# Patient Record
Sex: FEMALE | Race: WHITE | NOT HISPANIC OR LATINO | Employment: FULL TIME | URBAN - METROPOLITAN AREA
[De-identification: names, ages, dates, MRNs, and addresses within clinical notes are randomized per-mention and may not be internally consistent; named-entity substitution may affect disease eponyms.]

---

## 2017-09-09 ENCOUNTER — OFFICE VISIT (OUTPATIENT)
Dept: URGENT CARE | Facility: CLINIC | Age: 47
End: 2017-09-09
Payer: COMMERCIAL

## 2017-09-09 VITALS
RESPIRATION RATE: 20 BRPM | HEART RATE: 84 BPM | OXYGEN SATURATION: 96 % | WEIGHT: 172 LBS | HEIGHT: 65 IN | DIASTOLIC BLOOD PRESSURE: 80 MMHG | BODY MASS INDEX: 28.66 KG/M2 | SYSTOLIC BLOOD PRESSURE: 128 MMHG | TEMPERATURE: 98.2 F

## 2017-09-09 DIAGNOSIS — J06.9 VIRAL URI WITH COUGH: ICD-10-CM

## 2017-09-09 LAB
INT CON NEG: NORMAL
INT CON POS: NORMAL
S PYO AG THROAT QL: NEGATIVE

## 2017-09-09 PROCEDURE — 99203 OFFICE O/P NEW LOW 30 MIN: CPT | Performed by: PHYSICIAN ASSISTANT

## 2017-09-09 PROCEDURE — 87880 STREP A ASSAY W/OPTIC: CPT | Performed by: PHYSICIAN ASSISTANT

## 2017-09-09 RX ORDER — ESTRADIOL 10 UG/1
10 INSERT VAGINAL EVERY EVENING
COMMUNITY

## 2017-09-09 RX ORDER — LEVOTHYROXINE SODIUM 0.12 MG/1
125 TABLET ORAL
COMMUNITY

## 2017-09-09 RX ORDER — LEUCOVORIN CALCIUM 5 MG/1
5 TABLET ORAL
COMMUNITY

## 2017-09-09 ASSESSMENT — ENCOUNTER SYMPTOMS
SPUTUM PRODUCTION: 0
SHORTNESS OF BREATH: 0
SORE THROAT: 1
COUGH: 1
PALPITATIONS: 0
CHILLS: 1
HEMOPTYSIS: 0
EYE REDNESS: 0
HEADACHES: 0
EYE DISCHARGE: 0
EYE PAIN: 0
FEVER: 0
WHEEZING: 0
STRIDOR: 0

## 2017-09-09 NOTE — PATIENT INSTRUCTIONS
Serous Otitis Media  Serous otitis media is fluid in the middle ear space. This space contains the bones for hearing and air. Air in the middle ear space helps to transmit sound.   The air gets there through the eustachian tube. This tube goes from the back of the nose (nasopharynx) to the middle ear space. It keeps the pressure in the middle ear the same as the outside world. It also helps to drain fluid from the middle ear space.  CAUSES   Serous otitis media occurs when the eustachian tube gets blocked. Blockage can come from:  · Ear infections.  · Colds and other upper respiratory infections.  · Allergies.  · Irritants such as cigarette smoke.  · Sudden changes in air pressure (such as descending in an airplane).  · Enlarged adenoids.  · A mass in the nasopharynx.  During colds and upper respiratory infections, the middle ear space can become temporarily filled with fluid. This can happen after an ear infection also. Once the infection clears, the fluid will generally drain out of the ear through the eustachian tube. If it does not, then serous otitis media occurs.  SIGNS AND SYMPTOMS   · Hearing loss.  · A feeling of fullness in the ear, without pain.  · Young children may not show any symptoms but may show slight behavioral changes, such as agitation, ear pulling, or crying.  DIAGNOSIS   Serous otitis media is diagnosed by an ear exam. Tests may be done to check on the movement of the eardrum. Hearing exams may also be done.  TREATMENT   The fluid most often goes away without treatment. If allergy is the cause, allergy treatment may be helpful. Fluid that persists for several months may require minor surgery. A small tube is placed in the eardrum to:  · Drain the fluid.  · Restore the air in the middle ear space.  In certain situations, antibiotic medicines are used to avoid surgery. Surgery may be done to remove enlarged adenoids (if this is the cause).  HOME CARE INSTRUCTIONS   · Keep children away from  tobacco smoke.  · Keep all follow-up visits as directed by your health care provider.  SEEK MEDICAL CARE IF:   · Your hearing is not better in 3 months.  · Your hearing is worse.  · You have ear pain.  · You have drainage from the ear.  · You have dizziness.  · You have serous otitis media only in one ear or have any bleeding from your nose (epistaxis).  · You notice a lump on your neck.  MAKE SURE YOU:  · Understand these instructions.    · Will watch your condition.    · Will get help right away if you are not doing well or get worse.       This information is not intended to replace advice given to you by your health care provider. Make sure you discuss any questions you have with your health care provider.     Document Released: 03/09/2005 Document Revised: 01/08/2016 Document Reviewed: 07/15/2014  [employer] Interactive Patient Education ©2016 [employer]

## 2017-09-10 NOTE — PROGRESS NOTES
"Subjective:      Greer Vargas is a 47 y.o. female who presents with Pharyngitis (Few days stuffy nose , sorethroat , dry cough )            Pharyngitis    This is a new problem. The current episode started yesterday. The problem has been unchanged. There has been no fever. The pain is mild. Associated symptoms include congestion, coughing and ear pain. Pertinent negatives include no ear discharge, headaches, shortness of breath or stridor. She has tried NSAIDs and gargles for the symptoms. The treatment provided moderate relief.       Review of Systems   Constitutional: Positive for chills and malaise/fatigue. Negative for fever.   HENT: Positive for congestion, ear pain and sore throat. Negative for ear discharge.    Eyes: Negative for pain, discharge and redness.   Respiratory: Positive for cough. Negative for hemoptysis, sputum production, shortness of breath, wheezing and stridor.    Cardiovascular: Negative for chest pain and palpitations.   Skin: Negative for itching and rash.   Neurological: Negative for headaches.   All other systems reviewed and are negative.         Objective:     /80   Pulse 84   Temp 36.8 °C (98.2 °F)   Resp 20   Ht 1.638 m (5' 4.5\")   Wt 78 kg (172 lb)   SpO2 96%   BMI 29.07 kg/m²      Physical Exam   Constitutional: She is oriented to person, place, and time. Vital signs are normal. She appears well-developed and well-nourished.   HENT:   Head: Normocephalic and atraumatic.   Right Ear: Hearing, tympanic membrane, external ear and ear canal normal.   Left Ear: Hearing, tympanic membrane, external ear and ear canal normal.   Nose: Nose normal.   Mouth/Throat: Uvula is midline and mucous membranes are normal. Posterior oropharyngeal erythema present. No oropharyngeal exudate, posterior oropharyngeal edema or tonsillar abscesses.   Neck: Normal range of motion. Neck supple.   Cardiovascular: Normal rate and regular rhythm.  Exam reveals no gallop and no friction rub.    No " murmur heard.  Pulmonary/Chest: Effort normal and breath sounds normal. She has no wheezes. She has no rales.   Lymphadenopathy:     She has no cervical adenopathy.   Neurological: She is alert and oriented to person, place, and time.   Skin: Skin is warm and dry.   Psychiatric: She has a normal mood and affect. Her behavior is normal. Judgment and thought content normal.   Vitals reviewed.            Rapid Strep: NEG  Assessment/Plan:     1. Viral URI with cough  - OTC decongestant/cough medicine  - POCT Rapid Strep A    Differential diagnosis, natural history, supportive care, and indications for immediate follow-up discussed at length.   Follow-up with primary care provider within 4-5 days, emergency room precautions discussed.  Patient and/or family appears understanding of information.

## 2017-09-27 ENCOUNTER — HOSPITAL ENCOUNTER (OUTPATIENT)
Facility: MEDICAL CENTER | Age: 47
End: 2017-09-27
Attending: PHYSICIAN ASSISTANT
Payer: COMMERCIAL

## 2017-09-27 ENCOUNTER — OFFICE VISIT (OUTPATIENT)
Dept: URGENT CARE | Facility: CLINIC | Age: 47
End: 2017-09-27
Payer: COMMERCIAL

## 2017-09-27 VITALS
RESPIRATION RATE: 14 BRPM | SYSTOLIC BLOOD PRESSURE: 104 MMHG | HEART RATE: 101 BPM | BODY MASS INDEX: 27.99 KG/M2 | TEMPERATURE: 98.1 F | WEIGHT: 168 LBS | HEIGHT: 65 IN | DIASTOLIC BLOOD PRESSURE: 86 MMHG | OXYGEN SATURATION: 100 %

## 2017-09-27 DIAGNOSIS — N76.0 ACUTE VAGINITIS: ICD-10-CM

## 2017-09-27 DIAGNOSIS — J02.9 EXUDATIVE PHARYNGITIS: ICD-10-CM

## 2017-09-27 DIAGNOSIS — K13.21 LEUKOPLAKIA OF ORAL CAVITY: ICD-10-CM

## 2017-09-27 DIAGNOSIS — K13.29 TONGUE PLAQUE: Primary | ICD-10-CM

## 2017-09-27 PROCEDURE — 87591 N.GONORRHOEAE DNA AMP PROB: CPT

## 2017-09-27 PROCEDURE — 87660 TRICHOMONAS VAGIN DIR PROBE: CPT

## 2017-09-27 PROCEDURE — 87480 CANDIDA DNA DIR PROBE: CPT

## 2017-09-27 PROCEDURE — 99214 OFFICE O/P EST MOD 30 MIN: CPT | Performed by: PHYSICIAN ASSISTANT

## 2017-09-27 PROCEDURE — 87491 CHLMYD TRACH DNA AMP PROBE: CPT

## 2017-09-27 PROCEDURE — 87070 CULTURE OTHR SPECIMN AEROBIC: CPT

## 2017-09-27 PROCEDURE — 99000 SPECIMEN HANDLING OFFICE-LAB: CPT | Performed by: PHYSICIAN ASSISTANT

## 2017-09-27 PROCEDURE — 87510 GARDNER VAG DNA DIR PROBE: CPT

## 2017-09-27 RX ORDER — CEFDINIR 300 MG/1
300 CAPSULE ORAL 2 TIMES DAILY
Qty: 20 CAP | Refills: 0 | Status: SHIPPED | OUTPATIENT
Start: 2017-09-27 | End: 2017-10-07

## 2017-09-27 RX ORDER — CEFDINIR 300 MG/1
300 CAPSULE ORAL 2 TIMES DAILY
Qty: 20 CAP | Refills: 0 | Status: SHIPPED | OUTPATIENT
Start: 2017-09-27 | End: 2017-09-27 | Stop reason: SDUPTHER

## 2017-09-27 NOTE — PATIENT INSTRUCTIONS
Leukoplakia  Leukoplakia refers to white patches that develop in your mouth. These patches may show up on the insides of your cheeks, on or under your tongue, or on your gums. Leukoplakia can also develop on the outer area of the female genitalia (vulva ). In rare cases, it can occur in the area around the anus.  Leukoplakia usually goes away with treatment. In some cases, leukoplakia can indicate an increased risk of cancer.  CAUSES  Many conditions can cause or increase the risk of leukoplakia in the mouth. These may include:  · Any type of tobacco use, especially when combined with the use of alcohol.  · Irritation of the mouth from rough teeth or dentures.  · Having a weakened defense system (immune system), such as occurs with HIV or AIDS or after a bone marrow transplant.  Many conditions can also cause or increase the risk of leukoplakia of the vulva, including:  · Long-lasting infections of the vulva.  · Some skin diseases.  · Long-lasting irritation of the vulva.  · Long-term use of steroid creams or ointments.  · Having a weakened immune system, such as occurs with HIV or AIDS or after a bone marrow transplant.  SIGNS AND SYMPTOMS  The main symptom is the development of patches or flat areas in the mouth or on the vulva. These patches may be:  · Odd shaped.  · Hard.  · Raised.  · White or gray in color. Some areas may be reddened.  · Unable to be wiped away or scraped away.  · Fuzzy.  · Sensitive to touch, heat, or foods that are spicy or acidic.  DIAGNOSIS  Leukoplakia has a distinct appearance, so your health care provider can usually make a diagnosis by closely examining the affected area. A sample of the white patch may be removed (biopsy) and examined under a microscope. This helps to confirm the diagnosis and make sure that it is not a form of cancer.  TREATMENT  Treatment for leukoplakia may include:  · Stopping tobacco use.  · Repairing any rough teeth or dentures.  · Surgical removal of the patch.  This may be done using a surgical knife (scalpel), laser, heat, or cold.  · Medicines that can be taken by mouth.  · Medicines that can be applied to the patches.  HOME CARE INSTRUCTIONS  · Do not use any tobacco products, including cigarettes, chewing tobacco, or electronic cigarettes. If you need help quitting, ask your health care provider.  · Check with your dentist to see if you need any repairs to teeth or dentures.  · Take medicines only as directed by your health care provider.  · Avoid foods or beverages that seem to irritate the leukoplakia.  SEEK MEDICAL CARE IF:  · You develop new patches of leukoplakia.  · You notice changes in the size, shape, or feeling of existing patches.  · You have a fever.  SEEK IMMEDIATE MEDICAL CARE IF:  · You have severe pain in the area of a patch, and the pain is not helped by prescribed medicine.  · You have bleeding in the area of a patch, and you cannot stop the bleeding.  · You have a patch in your mouth that becomes so swollen that you have trouble eating or breathing.  · You have a patch at the vulva that becomes so swollen that you have trouble passing urine.     This information is not intended to replace advice given to you by your health care provider. Make sure you discuss any questions you have with your health care provider.     Document Released: 11/30/2009 Document Revised: 01/08/2016 Document Reviewed: 07/28/2015  [employer] Interactive Patient Education ©2016 [employer]

## 2017-09-27 NOTE — PROGRESS NOTES
"Subjective:      PT is a 47 y.o. female who presents with Other (Sore Throat)            HPI  Pt notes 2 weeks for sore throat with redness and white spots on the back of the throat along with white \"plaques\" on the right side of her tongue and right gums. Pt also notes midl vaginitis and wishes to have swabs of all regions she is concerned with. Pt has not taken any Rx medications for this condition. Pt states the pain is a 7/10, aching in nature and worse at night. Pt denies CP, SOB, NVD, paresthesias, headaches, dizziness, change in vision, hives, or other joint pain. The pt's medication list, problem list, and allergies have been evaluated and reviewed during today's visit.    PMH:  Negative per pt.      PSH:  Negative per pt.      Fam Hx:  the patient's family history is not pertinent to their current complaint      Soc HX:  Social History     Social History   • Marital status:      Spouse name: N/A   • Number of children: N/A   • Years of education: N/A     Occupational History   • Not on file.     Social History Main Topics   • Smoking status: Former Smoker   • Smokeless tobacco: Never Used      Comment: 4 years ago .   • Alcohol use Not on file   • Drug use: Unknown   • Sexual activity: Not on file     Other Topics Concern   • Not on file     Social History Narrative   • No narrative on file         Medications:    Current Outpatient Prescriptions:   •  cefdinir (OMNICEF) 300 MG Cap, Take 1 Cap by mouth 2 times a day for 10 days., Disp: 20 Cap, Rfl: 0  •  nystatin (MYCOSTATIN) 974674 UNIT/ML Suspension, Take 5 mL by mouth 4 times a day for 10 days., Disp: 200 mL, Rfl: 0  •  Abatacept (ORENCIA CLICKJECT) 125 MG/ML Solution Auto-injector, Inject  as instructed., Disp: , Rfl:   •  Methotrexate, PF, 15 MG/0.4ML Solution Auto-injector, Inject  as instructed., Disp: , Rfl:   •  leucovorin 5 MG Tab, Take 5 mg by mouth every day., Disp: , Rfl:   •  estradiol (VAGIFEM) 10 MCG Tab, Insert 10 mcg in vagina every " "day., Disp: , Rfl:   •  levothyroxine (SYNTHROID) 125 MCG Tab, Take 125 mcg by mouth Every morning on an empty stomach., Disp: , Rfl:       Allergies:  Review of patient's allergies indicates no known allergies.    ROS  Constitutional: Negative for fever, chills and malaise/fatigue.   HENT: Negative for congestion and +sore throat.    Eyes: Negative for blurred vision, double vision and photophobia.   Respiratory: Negative for cough and shortness of breath.    Cardiovascular: Negative for chest pain and palpitations.   Gastrointestinal: Negative for heartburn, nausea, vomiting, abdominal pain, diarrhea and constipation.   Genitourinary: Negative for dysuria and flank pain. +vaginitis  Musculoskeletal: Negative for joint pain and myalgias.   Skin: Negative for itching and rash.   Neurological: Negative for dizziness, tingling and headaches.   Endo/Heme/Allergies: Does not bruise/bleed easily.   Psychiatric/Behavioral: Negative for depression. The patient is not nervous/anxious.           Objective:     /86   Pulse (!) 101   Temp 36.7 °C (98.1 °F)   Resp 14   Ht 1.638 m (5' 4.5\")   Wt 76.2 kg (168 lb)   SpO2 100%   BMI 28.39 kg/m²      Physical Exam   HENT:   Mouth/Throat: Uvula is midline and mucous membranes are normal. Oral lesions present. No trismus in the jaw. Abnormal dentition. Dental caries present. No dental abscesses, uvula swelling or lacerations. Oropharyngeal exudate, posterior oropharyngeal edema and posterior oropharyngeal erythema present. No tonsillar abscesses.             Physical Exam   Constitutional: She is oriented to person, place, and time. She appears well-developed and well-nourished. No distress.   HENT:   Head: Normocephalic and atraumatic.   Right Ear: External ear normal.   Left Ear: External ear normal.   Nose: Nose normal.   Eyes: Conjunctivae normal and EOM are normal. Pupils are equal, round, and reactive to light.   Neck: Normal range of motion. Neck supple. No " thyromegaly present.   Cardiovascular: Normal rate, regular rhythm, normal heart sounds and intact distal pulses.  Exam reveals no gallop and no friction rub.    No murmur heard.  Pulmonary/Chest: Effort normal and breath sounds normal. No respiratory distress. She has no wheezes. She has no rales. She exhibits no tenderness.   Abdominal: Soft. Bowel sounds are normal. She exhibits no distension and no mass. There is no tenderness. There is no rebound and no guarding.   Genitourinary:        Pt deferred and wishes to collect own vaginal swabs  Musculoskeletal: Normal range of motion. She exhibits no edema and no tenderness.   Lymphadenopathy:     She has no cervical adenopathy.   Neurological: She is alert and oriented to person, place, and time. She has normal reflexes. No cranial nerve deficit.   Skin: Skin is warm and dry. No rash noted. No erythema.   Psychiatric: She has a normal mood and affect. Her behavior is normal. Judgment and thought content normal.          Assessment/Plan:     1. Tongue plaque    - REFERRAL TO ENT  - nystatin (MYCOSTATIN) [id number] UNIT/ML Suspension; Take 5 mL by mouth 4 times a day for 10 days.  Dispense: 200 mL; Refill: 0    2. Exudative pharyngitis    - CULTURE THROAT; Future  - REFERRAL TO ENT  - cefdinir (OMNICEF) 300 MG Cap; Take 1 Cap by mouth 2 times a day for 10 days.  Dispense: 20 Cap; Refill: 0    3. Leukoplakia of oral cavity      4. Acute vaginitis    - VAGINAL PATHOGENS DNA PANEL; Future  - CHLAMYDIA/GC PCR URINE OR SWAB; Future    Rest, fluids encouraged.  AVS with medical info given.  Pt was in full understanding and agreement with the plan.  Follow-up as needed if symptoms worsen or fail to improve.

## 2017-09-28 ENCOUNTER — TELEPHONE (OUTPATIENT)
Dept: URGENT CARE | Facility: CLINIC | Age: 47
End: 2017-09-28

## 2017-09-28 DIAGNOSIS — B37.31 VAGINA, CANDIDIASIS: ICD-10-CM

## 2017-09-28 LAB
C TRACH DNA SPEC QL NAA+PROBE: NEGATIVE
CANDIDA DNA VAG QL PROBE+SIG AMP: POSITIVE
G VAGINALIS DNA VAG QL PROBE+SIG AMP: NEGATIVE
N GONORRHOEA DNA SPEC QL NAA+PROBE: NEGATIVE
SPECIMEN SOURCE: NORMAL
T VAGINALIS DNA VAG QL PROBE+SIG AMP: NEGATIVE

## 2017-09-28 RX ORDER — FLUCONAZOLE 150 MG/1
TABLET ORAL
Qty: 3 TAB | Refills: 0 | Status: SHIPPED | OUTPATIENT
Start: 2017-09-28 | End: 2017-09-29

## 2017-09-29 ENCOUNTER — HOSPITAL ENCOUNTER (OUTPATIENT)
Facility: MEDICAL CENTER | Age: 47
End: 2017-09-30
Attending: EMERGENCY MEDICINE | Admitting: INTERNAL MEDICINE
Payer: COMMERCIAL

## 2017-09-29 ENCOUNTER — APPOINTMENT (OUTPATIENT)
Dept: RADIOLOGY | Facility: MEDICAL CENTER | Age: 47
End: 2017-09-29
Attending: EMERGENCY MEDICINE
Payer: COMMERCIAL

## 2017-09-29 DIAGNOSIS — F15.10 EXCESSIVE CAFFEINE ABUSE, CONTINUOUS (HCC): ICD-10-CM

## 2017-09-29 DIAGNOSIS — R06.02 SHORTNESS OF BREATH: ICD-10-CM

## 2017-09-29 DIAGNOSIS — F15.10 METHAMPHETAMINE ABUSE, EPISODIC (HCC): ICD-10-CM

## 2017-09-29 DIAGNOSIS — R07.9 CHEST PAIN, UNSPECIFIED TYPE: ICD-10-CM

## 2017-09-29 DIAGNOSIS — F43.23 SITUATIONAL MIXED ANXIETY AND DEPRESSIVE DISORDER: ICD-10-CM

## 2017-09-29 DIAGNOSIS — R07.89 INTERMITTENT LEFT-SIDED CHEST PAIN: ICD-10-CM

## 2017-09-29 LAB
ALBUMIN SERPL BCP-MCNC: 4.4 G/DL (ref 3.2–4.9)
ALBUMIN/GLOB SERPL: 1.5 G/DL
ALP SERPL-CCNC: 70 U/L (ref 30–99)
ALT SERPL-CCNC: 30 U/L (ref 2–50)
ANION GAP SERPL CALC-SCNC: 9 MMOL/L (ref 0–11.9)
APTT PPP: 32.1 SEC (ref 24.7–36)
AST SERPL-CCNC: 27 U/L (ref 12–45)
BASOPHILS # BLD AUTO: 0.7 % (ref 0–1.8)
BASOPHILS # BLD: 0.05 K/UL (ref 0–0.12)
BILIRUB SERPL-MCNC: 0.4 MG/DL (ref 0.1–1.5)
BUN SERPL-MCNC: 9 MG/DL (ref 8–22)
CALCIUM SERPL-MCNC: 9.5 MG/DL (ref 8.5–10.5)
CHLORIDE SERPL-SCNC: 102 MMOL/L (ref 96–112)
CO2 SERPL-SCNC: 25 MMOL/L (ref 20–33)
CREAT SERPL-MCNC: 0.79 MG/DL (ref 0.5–1.4)
DEPRECATED D DIMER PPP IA-ACNC: <200 NG/ML(D-DU)
EKG IMPRESSION: NORMAL
EOSINOPHIL # BLD AUTO: 0.1 K/UL (ref 0–0.51)
EOSINOPHIL NFR BLD: 1.4 % (ref 0–6.9)
ERYTHROCYTE [DISTWIDTH] IN BLOOD BY AUTOMATED COUNT: 49.6 FL (ref 35.9–50)
GFR SERPL CREATININE-BSD FRML MDRD: >60 ML/MIN/1.73 M 2
GLOBULIN SER CALC-MCNC: 2.9 G/DL (ref 1.9–3.5)
GLUCOSE SERPL-MCNC: 100 MG/DL (ref 65–99)
HCT VFR BLD AUTO: 42.2 % (ref 37–47)
HGB BLD-MCNC: 14.8 G/DL (ref 12–16)
IMM GRANULOCYTES # BLD AUTO: 0.02 K/UL (ref 0–0.11)
IMM GRANULOCYTES NFR BLD AUTO: 0.3 % (ref 0–0.9)
INR PPP: 0.97 (ref 0.87–1.13)
LIPASE SERPL-CCNC: 34 U/L (ref 11–82)
LYMPHOCYTES # BLD AUTO: 1.06 K/UL (ref 1–4.8)
LYMPHOCYTES NFR BLD: 15.1 % (ref 22–41)
MCH RBC QN AUTO: 34.6 PG (ref 27–33)
MCHC RBC AUTO-ENTMCNC: 35.1 G/DL (ref 33.6–35)
MCV RBC AUTO: 98.6 FL (ref 81.4–97.8)
MONOCYTES # BLD AUTO: 0.41 K/UL (ref 0–0.85)
MONOCYTES NFR BLD AUTO: 5.8 % (ref 0–13.4)
NEUTROPHILS # BLD AUTO: 5.37 K/UL (ref 2–7.15)
NEUTROPHILS NFR BLD: 76.7 % (ref 44–72)
NRBC # BLD AUTO: 0 K/UL
NRBC BLD AUTO-RTO: 0 /100 WBC
PLATELET # BLD AUTO: 360 K/UL (ref 164–446)
PMV BLD AUTO: 8.5 FL (ref 9–12.9)
POTASSIUM SERPL-SCNC: 3.7 MMOL/L (ref 3.6–5.5)
PROT SERPL-MCNC: 7.3 G/DL (ref 6–8.2)
PROTHROMBIN TIME: 13.2 SEC (ref 12–14.6)
RBC # BLD AUTO: 4.28 M/UL (ref 4.2–5.4)
SODIUM SERPL-SCNC: 136 MMOL/L (ref 135–145)
TROPONIN I SERPL-MCNC: <0.01 NG/ML (ref 0–0.04)
WBC # BLD AUTO: 7 K/UL (ref 4.8–10.8)

## 2017-09-29 PROCEDURE — 93005 ELECTROCARDIOGRAM TRACING: CPT | Performed by: EMERGENCY MEDICINE

## 2017-09-29 PROCEDURE — 84484 ASSAY OF TROPONIN QUANT: CPT

## 2017-09-29 PROCEDURE — 80053 COMPREHEN METABOLIC PANEL: CPT

## 2017-09-29 PROCEDURE — 85730 THROMBOPLASTIN TIME PARTIAL: CPT

## 2017-09-29 PROCEDURE — 84443 ASSAY THYROID STIM HORMONE: CPT

## 2017-09-29 PROCEDURE — 85025 COMPLETE CBC W/AUTO DIFF WBC: CPT

## 2017-09-29 PROCEDURE — 80307 DRUG TEST PRSMV CHEM ANLYZR: CPT

## 2017-09-29 PROCEDURE — 85379 FIBRIN DEGRADATION QUANT: CPT

## 2017-09-29 PROCEDURE — 85610 PROTHROMBIN TIME: CPT

## 2017-09-29 PROCEDURE — 99285 EMERGENCY DEPT VISIT HI MDM: CPT

## 2017-09-29 PROCEDURE — 83690 ASSAY OF LIPASE: CPT

## 2017-09-29 PROCEDURE — 93005 ELECTROCARDIOGRAM TRACING: CPT

## 2017-09-29 PROCEDURE — 71010 DX-CHEST-PORTABLE (1 VIEW): CPT

## 2017-09-29 PROCEDURE — 36415 COLL VENOUS BLD VENIPUNCTURE: CPT

## 2017-09-29 RX ORDER — METHOTREXATE 25 MG/ML
25 INJECTION, SOLUTION INTRA-ARTERIAL; INTRAMUSCULAR; INTRAVENOUS
COMMUNITY

## 2017-09-29 RX ORDER — NAPROXEN SODIUM 220 MG
220-440 TABLET ORAL 2 TIMES DAILY PRN
COMMUNITY

## 2017-09-29 RX ORDER — OXYCODONE AND ACETAMINOPHEN 7.5; 325 MG/1; MG/1
1 TABLET ORAL EVERY 6 HOURS PRN
COMMUNITY

## 2017-09-29 RX ORDER — CLOMIPRAMINE HYDROCHLORIDE 50 MG/1
1000 CAPSULE ORAL NIGHTLY
COMMUNITY

## 2017-09-29 RX ORDER — ASPIRIN 81 MG/1
81 TABLET, CHEWABLE ORAL DAILY
COMMUNITY

## 2017-09-29 RX ORDER — ALPHA LIPOIC ACID 200 MG
1 CAPSULE ORAL DAILY
COMMUNITY

## 2017-09-29 RX ORDER — TEMAZEPAM 30 MG/1
30 CAPSULE ORAL
COMMUNITY

## 2017-09-30 ENCOUNTER — RESOLUTE PROFESSIONAL BILLING HOSPITAL PROF FEE (OUTPATIENT)
Dept: HOSPITALIST | Facility: MEDICAL CENTER | Age: 47
End: 2017-09-30
Payer: COMMERCIAL

## 2017-09-30 VITALS
DIASTOLIC BLOOD PRESSURE: 67 MMHG | RESPIRATION RATE: 16 BRPM | TEMPERATURE: 98.1 F | OXYGEN SATURATION: 97 % | HEART RATE: 73 BPM | WEIGHT: 171.08 LBS | BODY MASS INDEX: 29.21 KG/M2 | SYSTOLIC BLOOD PRESSURE: 122 MMHG | HEIGHT: 64 IN

## 2017-09-30 DIAGNOSIS — R07.9 CHEST PAIN, UNSPECIFIED TYPE: ICD-10-CM

## 2017-09-30 PROBLEM — M06.9 RHEUMATOID ARTHRITIS (HCC): Status: ACTIVE | Noted: 2017-09-30

## 2017-09-30 LAB
AMPHET UR QL SCN: POSITIVE
BACTERIA SPEC RESP CULT: NORMAL
BARBITURATES UR QL SCN: NEGATIVE
BENZODIAZ UR QL SCN: NEGATIVE
BZE UR QL SCN: NEGATIVE
CANNABINOIDS UR QL SCN: NEGATIVE
ETHANOL BLD-MCNC: 0 G/DL
METHADONE UR QL SCN: NEGATIVE
OPIATES UR QL SCN: NEGATIVE
OXYCODONE UR QL SCN: NEGATIVE
PCP UR QL SCN: NEGATIVE
PROPOXYPH UR QL SCN: NEGATIVE
SIGNIFICANT IND 70042: NORMAL
SOURCE SOURCE: NORMAL
TROPONIN I SERPL-MCNC: <0.01 NG/ML (ref 0–0.04)
TROPONIN I SERPL-MCNC: <0.01 NG/ML (ref 0–0.04)
TSH SERPL DL<=0.005 MIU/L-ACNC: 0.57 UIU/ML (ref 0.3–3.7)

## 2017-09-30 PROCEDURE — A9270 NON-COVERED ITEM OR SERVICE: HCPCS | Performed by: INTERNAL MEDICINE

## 2017-09-30 PROCEDURE — 700102 HCHG RX REV CODE 250 W/ 637 OVERRIDE(OP): Performed by: INTERNAL MEDICINE

## 2017-09-30 PROCEDURE — 93010 ELECTROCARDIOGRAM REPORT: CPT | Performed by: INTERNAL MEDICINE

## 2017-09-30 PROCEDURE — G0378 HOSPITAL OBSERVATION PER HR: HCPCS

## 2017-09-30 PROCEDURE — 700105 HCHG RX REV CODE 258: Performed by: STUDENT IN AN ORGANIZED HEALTH CARE EDUCATION/TRAINING PROGRAM

## 2017-09-30 PROCEDURE — 99219 PR INITIAL OBSERVATION CARE,LEVL II: CPT | Mod: GC | Performed by: INTERNAL MEDICINE

## 2017-09-30 PROCEDURE — A9270 NON-COVERED ITEM OR SERVICE: HCPCS | Performed by: STUDENT IN AN ORGANIZED HEALTH CARE EDUCATION/TRAINING PROGRAM

## 2017-09-30 PROCEDURE — 36415 COLL VENOUS BLD VENIPUNCTURE: CPT

## 2017-09-30 PROCEDURE — 94760 N-INVAS EAR/PLS OXIMETRY 1: CPT

## 2017-09-30 PROCEDURE — 700102 HCHG RX REV CODE 250 W/ 637 OVERRIDE(OP): Performed by: STUDENT IN AN ORGANIZED HEALTH CARE EDUCATION/TRAINING PROGRAM

## 2017-09-30 PROCEDURE — 84484 ASSAY OF TROPONIN QUANT: CPT

## 2017-09-30 PROCEDURE — 93005 ELECTROCARDIOGRAM TRACING: CPT | Performed by: STUDENT IN AN ORGANIZED HEALTH CARE EDUCATION/TRAINING PROGRAM

## 2017-09-30 RX ORDER — POLYETHYLENE GLYCOL 3350 17 G/17G
1 POWDER, FOR SOLUTION ORAL
Status: DISCONTINUED | OUTPATIENT
Start: 2017-09-30 | End: 2017-09-30 | Stop reason: HOSPADM

## 2017-09-30 RX ORDER — AMOXICILLIN 250 MG
2 CAPSULE ORAL 2 TIMES DAILY
Status: DISCONTINUED | OUTPATIENT
Start: 2017-09-30 | End: 2017-09-30 | Stop reason: HOSPADM

## 2017-09-30 RX ORDER — ASPIRIN 325 MG
325 TABLET ORAL ONCE
Status: COMPLETED | OUTPATIENT
Start: 2017-09-30 | End: 2017-09-30

## 2017-09-30 RX ORDER — OXYCODONE AND ACETAMINOPHEN 7.5; 325 MG/1; MG/1
1 TABLET ORAL EVERY 6 HOURS PRN
Status: DISCONTINUED | OUTPATIENT
Start: 2017-09-30 | End: 2017-09-30 | Stop reason: HOSPADM

## 2017-09-30 RX ORDER — SODIUM CHLORIDE 9 MG/ML
INJECTION, SOLUTION INTRAVENOUS CONTINUOUS
Status: DISCONTINUED | OUTPATIENT
Start: 2017-09-30 | End: 2017-09-30 | Stop reason: HOSPADM

## 2017-09-30 RX ORDER — ATORVASTATIN CALCIUM 80 MG/1
80 TABLET, FILM COATED ORAL
Status: DISCONTINUED | OUTPATIENT
Start: 2017-09-30 | End: 2017-09-30 | Stop reason: HOSPADM

## 2017-09-30 RX ORDER — LEVOTHYROXINE SODIUM 0.12 MG/1
125 TABLET ORAL
Status: DISCONTINUED | OUTPATIENT
Start: 2017-09-30 | End: 2017-09-30 | Stop reason: HOSPADM

## 2017-09-30 RX ORDER — NICOTINE 21 MG/24HR
14 PATCH, TRANSDERMAL 24 HOURS TRANSDERMAL
Status: DISCONTINUED | OUTPATIENT
Start: 2017-09-30 | End: 2017-09-30 | Stop reason: HOSPADM

## 2017-09-30 RX ORDER — BISACODYL 10 MG
10 SUPPOSITORY, RECTAL RECTAL
Status: DISCONTINUED | OUTPATIENT
Start: 2017-09-30 | End: 2017-09-30 | Stop reason: HOSPADM

## 2017-09-30 RX ORDER — CLOMIPRAMINE HYDROCHLORIDE 50 MG/1
100 CAPSULE ORAL NIGHTLY
Status: DISCONTINUED | OUTPATIENT
Start: 2017-09-30 | End: 2017-09-30 | Stop reason: HOSPADM

## 2017-09-30 RX ADMIN — OXYCODONE HYDROCHLORIDE AND ACETAMINOPHEN 1 TABLET: 7.5; 325 TABLET ORAL at 11:46

## 2017-09-30 RX ADMIN — ASPIRIN 325 MG: 325 TABLET, COATED ORAL at 05:16

## 2017-09-30 RX ADMIN — ATORVASTATIN CALCIUM 80 MG: 80 TABLET, FILM COATED ORAL at 05:18

## 2017-09-30 RX ADMIN — LEVOTHYROXINE SODIUM 125 MCG: 125 TABLET ORAL at 05:19

## 2017-09-30 RX ADMIN — SODIUM CHLORIDE: 9 INJECTION, SOLUTION INTRAVENOUS at 02:40

## 2017-09-30 RX ADMIN — NICOTINE 14 MG: 14 PATCH, EXTENDED RELEASE TRANSDERMAL at 05:18

## 2017-09-30 ASSESSMENT — ENCOUNTER SYMPTOMS
PALPITATIONS: 0
CONSTIPATION: 1
SENSORY CHANGE: 0
DIZZINESS: 0
SPUTUM PRODUCTION: 0
SHORTNESS OF BREATH: 0
BRUISES/BLEEDS EASILY: 0
BACK PAIN: 1
NERVOUS/ANXIOUS: 1
ORTHOPNEA: 0
NAUSEA: 0
DIARRHEA: 0
FEVER: 0
SORE THROAT: 1
CHILLS: 0
ABDOMINAL PAIN: 0
EYE PAIN: 0
COUGH: 0
FOCAL WEAKNESS: 0
HEMOPTYSIS: 0
PND: 0
TINGLING: 0
VOMITING: 0
HEARTBURN: 1
TREMORS: 0
HALLUCINATIONS: 0
HEADACHES: 0
DOUBLE VISION: 0
DEPRESSION: 1
MYALGIAS: 0
BLURRED VISION: 0

## 2017-09-30 ASSESSMENT — PAIN SCALES - GENERAL
PAINLEVEL_OUTOF10: 0

## 2017-09-30 ASSESSMENT — COPD QUESTIONNAIRES
HAVE YOU SMOKED AT LEAST 100 CIGARETTES IN YOUR ENTIRE LIFE: YES
DURING THE PAST 4 WEEKS HOW MUCH DID YOU FEEL SHORT OF BREATH: NONE/LITTLE OF THE TIME
COPD SCREENING SCORE: 4
DO YOU EVER COUGH UP ANY MUCUS OR PHLEGM?: NO/ONLY WITH OCCASIONAL COLDS OR INFECTIONS

## 2017-09-30 ASSESSMENT — PATIENT HEALTH QUESTIONNAIRE - PHQ9
2. FEELING DOWN, DEPRESSED, IRRITABLE, OR HOPELESS: NOT AT ALL
1. LITTLE INTEREST OR PLEASURE IN DOING THINGS: NOT AT ALL
SUM OF ALL RESPONSES TO PHQ9 QUESTIONS 1 AND 2: 0
SUM OF ALL RESPONSES TO PHQ QUESTIONS 1-9: 0

## 2017-09-30 ASSESSMENT — LIFESTYLE VARIABLES
SUBSTANCE_ABUSE: 1
EVER_SMOKED: YES
EVER_SMOKED: YES
ALCOHOL_USE: NO

## 2017-09-30 NOTE — NON-PROVIDER
"      Internal Medicine Medical Student History and Physical    Name Greer Vargas     1970   Age/Sex 47 y.o. female   MRN [id number]   Code Status DNR     After 5PM or if no immediate response to page, please call for cross-coverage  Attending/Team: Dr. Faulkner / Андрей See Patient List for primary contact information  Call [phone] to page    1st Call - Day Intern (R1):   Dr. Saenz 2nd Call - Day Sr. Resident (R2/R3):   Dr. Mojica       Chief Complaint:  Chest pain     HPI:  Pt is a 48 y/o female with PMHx of RA and hypothyroidism presenting for chest pain. She states it is central/left located, and describes it as sharp, severe, and radiating to her back. Took ASA and naproxen for the pain with no relief. No alleviating/aggravating factors. She has had similar pain in the past that was determined to be 2/2 her RA and costochondritis, but felt that this pain was somehow different, although she is not able to describe why beyond severity. She has been taking hydroxycut for her RA related fatigue for some time, and has also started snorting methamphetamine 3x/day for her fatigue starting 6 days ago. Her last meth use was at 1630 on . Denies alcohol and cocaine use. Uses CBD oil for RA associated pain. Works as a , , has been feeling stressed lately.     She states that she has had workups for her chest pain in the past, including an ECHO and stress test with Dr. Washington at [facility], both of which \"were negative.\" Records have been requested.     Hx of acid reflux for which she takes prilosec. Hx of \"bleeding ulcers\" with hematemisis as recent as summer 2017 2/2 heavy naproxen use for her RA. States that she does not take naproxen regularly anymore. Does admit to dark stools currently. She states that her last bowel movement was , and that she usually has one bowel movement a week with laxative use only.       Review of Systems   Constitutional: Positive for " "malaise/fatigue. Negative for chills and fever.   HENT: Positive for hearing loss and sore throat. Negative for congestion.         Hearing loss to R ear x3 weeks, improving   Eyes: Negative for blurred vision.   Respiratory: Negative for shortness of breath.    Cardiovascular: Positive for chest pain. Negative for palpitations, orthopnea, leg swelling and PND.   Gastrointestinal: Positive for constipation. Negative for nausea and vomiting.        \"dark stools\"    Genitourinary: Negative for dysuria and hematuria.   Musculoskeletal: Positive for back pain and joint pain.   Neurological: Negative for dizziness, tremors and focal weakness.     Past Medical History:   Rheumatoid Arthritis  Sjogrens  Hypothyroidism  Baker's cysts  Bulimia, age 20, s/p inpt tx  \"Brain lesions\" on MRI; records requested  Acid Reflux    Past Surgical History:  Cholecystectomy  Hysterectomy  Vaginal mesh placement with subsequent removal  Hip fracture surgery      Current Outpatient Medications:  Home Medications     Reviewed by Aga Leslie (Pharmacy Tech) on 09/29/17 at 2357  Med List Status: Complete   Medication Last Dose Status   Abatacept (ORENCIA) 125 MG/ML Solution Prefilled Syringe 9/26/2017 Active   aspirin (ASA) 81 MG Chew Tab chewable tablet 9/29/2017 Active   B Complex Vitamins (B COMPLEX-B12) Tab 9/29/2017 Active   cefdinir (OMNICEF) 300 MG Cap 9/29/2017 Active   clomipramine (ANAFRANIL) 50 MG Cap 9/28/2017 Active   estradiol (VAGIFEM) 10 MCG Tab 9/28/2017 Active   GINSENG PO 9/29/2017 Active   leucovorin 5 MG Tab 9/22/2017 Active   levothyroxine (SYNTHROID) 125 MCG Tab 9/29/2017 Active   Methotrexate Sodium 50 MG/2ML Solution 9/22/2017 Active   naproxen (ANAPROX) 220 MG tablet 9/29/2017 Active   nystatin (MYCOSTATIN) 539188 UNIT/ML Suspension 9/29/2017 Active   oxycodone-acetaminophen (PERCOCET) 7.5-325 MG per tablet 9/29/2017 Active   temazepam (RESTORIL) 30 MG capsule 9/28/2017 Active   vitamin D (CHOLECALCIFEROL) " "1000 UNIT Tab 9/29/2017 Active                Medication Allergy/Sensitivities:  No Known Allergies      Family History:  History reviewed. No pertinent family history.    Social History:  Social History     Social History   • Marital status:      Spouse name: N/A   • Number of children: N/A   • Years of education: N/A     Occupational History   • Not on file.     Social History Main Topics   • Smoking status: Current Every Day Smoker     Packs/day: 1.00     Types: Cigarettes   • Smokeless tobacco: Never Used      Comment: 4 years ago .   • Alcohol use Yes      Comment: occ   • Drug use:       Comment: snorted meth 1 week ago, first time use   • Sexual activity: Not on file     Other Topics Concern   • Not on file     Social History Narrative   • No narrative on file     Living situation: Lives in [place] with   PCP : Dr. Fritz      Physical Exam     Vitals:    09/30/17 0130 09/30/17 0210 09/30/17 0353 09/30/17 0400   BP:  122/58  114/56   Pulse: 75 72 77 88   Resp: 14 16 16 20   Temp:  35.9 °C (96.6 °F)  36.7 °C (98 °F)   SpO2: 99% 99% 97% 98%   Weight:  77.6 kg (171 lb 1.2 oz)     Height:  1.626 m (5' 4\")       Body mass index is 29.37 kg/m².  /56   Pulse 88   Temp 36.7 °C (98 °F)   Resp 20   Ht 1.626 m (5' 4\")   Wt 77.6 kg (171 lb 1.2 oz)   SpO2 98%   Breastfeeding? Unknown   BMI 29.37 kg/m²   O2 therapy: Pulse Oximetry: 98 %, O2 (LPM): 0, O2 Delivery: None (Room Air)    Physical Exam   Constitutional: She is oriented to person, place, and time and well-developed, well-nourished, and in no distress.   HENT:   Head: Normocephalic and atraumatic.   white patches to R posterior tongue and R gums. No bleeding observed   Eyes: Pupils are equal, round, and reactive to light.   Neck: Normal range of motion. No tracheal deviation present. No thyromegaly present.   Cardiovascular: Normal rate, regular rhythm and normal heart sounds.  Exam reveals no gallop and no friction rub.    No murmur " heard.  Pulmonary/Chest: Effort normal and breath sounds normal. No respiratory distress. She has no wheezes. She has no rales. She exhibits tenderness.   Slight increase in pain with palpation   Abdominal: Soft. Bowel sounds are normal. She exhibits no distension. There is no tenderness. There is no rebound and no guarding.   Musculoskeletal: Normal range of motion. She exhibits no edema.   Neurological: She is alert and oriented to person, place, and time. No cranial nerve deficit.   Skin: Skin is warm and dry.   Psychiatric: Judgment normal. Her mood appears anxious.   Tearful at times during conversation, especially when talking about her RA and her meth use             Data Review       Old Records Request:   Completed  Current Records review and summary: Completed    Lab Data Review:  Recent Results (from the past 24 hour(s))   EKG (ER)    Collection Time: 17 10:09 PM   Result Value Ref Range    Report       [facility] Emergency Dept.    Test Date:  2017  Pt Name:    NAIF WALKER                 Department: ER  MRN:        [id number]                      Room:  Gender:     F                            Technician: [id number]  :        1970                   Requested By:ER TRIAGE PROTOCOL  Order #:    [id number]                    Reading MD: WHITNEY YOUSIF MD    Measurements  Intervals                                Axis  Rate:       88                           P:          39  WA:         156                          QRS:        47  QRSD:       86                           T:          11  QT:         372  QTc:        450    Interpretive Statements  SINUS RHYTHM  PROBABLE LEFT ATRIAL ABNORMALITY  BORDERLINE T ABNORMALITIES, INFERIOR LEADS  No previous ECG available for comparison  No Acute findings  non specific twave flattening III, AVF, V3  Electronically Signed On 2017 22:52:42 PDT by WHITNEY YOUSIF MD     URINE DRUG SCREEN    Collection Time: 17 10:10 PM    Result Value Ref Range    Amphetamines Urine Positive (A) Negative    Barbiturates Negative Negative    Benzodiazepines Negative Negative    Cocaine Metabolite Negative Negative    Methadone Negative Negative    Opiates Negative Negative    Oxycodone Negative Negative    Phencyclidine -Pcp Negative Negative    Propoxyphene Negative Negative    Cannabinoid Metab Negative Negative   CBC w/ Differential    Collection Time: 09/29/17 10:45 PM   Result Value Ref Range    WBC 7.0 4.8 - 10.8 K/uL    RBC 4.28 4.20 - 5.40 M/uL    Hemoglobin 14.8 12.0 - 16.0 g/dL    Hematocrit 42.2 37.0 - 47.0 %    MCV 98.6 (H) 81.4 - 97.8 fL    MCH 34.6 (H) 27.0 - 33.0 pg    MCHC 35.1 (H) 33.6 - 35.0 g/dL    RDW 49.6 35.9 - 50.0 fL    Platelet Count 360 164 - 446 K/uL    MPV 8.5 (L) 9.0 - 12.9 fL    Neutrophils-Polys 76.70 (H) 44.00 - 72.00 %    Lymphocytes 15.10 (L) 22.00 - 41.00 %    Monocytes 5.80 0.00 - 13.40 %    Eosinophils 1.40 0.00 - 6.90 %    Basophils 0.70 0.00 - 1.80 %    Immature Granulocytes 0.30 0.00 - 0.90 %    Nucleated RBC 0.00 /100 WBC    Neutrophils (Absolute) 5.37 2.00 - 7.15 K/uL    Lymphs (Absolute) 1.06 1.00 - 4.80 K/uL    Monos (Absolute) 0.41 0.00 - 0.85 K/uL    Eos (Absolute) 0.10 0.00 - 0.51 K/uL    Baso (Absolute) 0.05 0.00 - 0.12 K/uL    Immature Granulocytes (abs) 0.02 0.00 - 0.11 K/uL    NRBC (Absolute) 0.00 K/uL   Complete Metabolic Panel (CMP)    Collection Time: 09/29/17 10:45 PM   Result Value Ref Range    Sodium 136 135 - 145 mmol/L    Potassium 3.7 3.6 - 5.5 mmol/L    Chloride 102 96 - 112 mmol/L    Co2 25 20 - 33 mmol/L    Anion Gap 9.0 0.0 - 11.9    Glucose 100 (H) 65 - 99 mg/dL    Bun 9 8 - 22 mg/dL    Creatinine 0.79 0.50 - 1.40 mg/dL    Calcium 9.5 8.5 - 10.5 mg/dL    AST(SGOT) 27 12 - 45 U/L    ALT(SGPT) 30 2 - 50 U/L    Alkaline Phosphatase 70 30 - 99 U/L    Total Bilirubin 0.4 0.1 - 1.5 mg/dL    Albumin 4.4 3.2 - 4.9 g/dL    Total Protein 7.3 6.0 - 8.2 g/dL    Globulin 2.9 1.9 - 3.5 g/dL    A-G  Ratio 1.5 g/dL   Troponin STAT    Collection Time: 09/29/17 10:45 PM   Result Value Ref Range    Troponin I <0.01 0.00 - 0.04 ng/mL   Lipase    Collection Time: 09/29/17 10:45 PM   Result Value Ref Range    Lipase 34 11 - 82 U/L   PT/INR    Collection Time: 09/29/17 10:45 PM   Result Value Ref Range    PT 13.2 12.0 - 14.6 sec    INR 0.97 0.87 - 1.13   APTT    Collection Time: 09/29/17 10:45 PM   Result Value Ref Range    APTT 32.1 24.7 - 36.0 sec   ESTIMATED GFR    Collection Time: 09/29/17 10:45 PM   Result Value Ref Range    GFR If African American >60 >60 mL/min/1.73 m 2    GFR If Non African American >60 >60 mL/min/1.73 m 2   D-DIMER    Collection Time: 09/29/17 10:45 PM   Result Value Ref Range    D-Dimer Screen <200 <250 ng/mL(D-DU)   DIAGNOSTIC ALCOHOL    Collection Time: 09/29/17 10:45 PM   Result Value Ref Range    Diagnostic Alcohol 0.00 0.00 g/dL   TSH WITH REFLEX TO FT4    Collection Time: 09/29/17 10:45 PM   Result Value Ref Range    TSH 0.570 0.300 - 3.700 uIU/mL       Imaging/Procedures Review:     Independant Imaging Review: Completed       DX-CHEST-PORTABLE (1 VIEW)   Final Result      No acute cardiopulmonary abnormality.          EKG:   EKG Independant Review: Completed  QTc: 450, HR: 88, Normal Sinus Rhythm    Records reviewed and summarized in current documentation             Assessment/Plan     Assessment & plan notes cannot be loaded without a specified hospital service.    46 y/o female w/ PMHx of RA and recent methamphetamine use presenting for chest pain    Chest pain  - recent meth use preceding chest pain, + UDS for amphetamine  - hx of RA with similar pain in the past  - no positional component to pain  - hx of bleeding gastric ulcers 2/2 NSAID use, currently reporting dark stools  - TSH 0.570  - lipase 34  - D-Dimer <200  - troponin 0.01  - EKG trending - questionable dynamic t wave changes  - CXR normal  - FOB negative  - ddx: costochondritis, anxiety, ACS, gastritis  Plan  - trend  troponin, EKG  - MPI outpatient  - ASA, statin  - NS at 100      Rheumatoid arthritis & Sjogren syndrome  - Patient reports poorly controlled symptoms  - Takes methotrexate SC once a week followed by leucovorin  - has used naproxen for pain in the past, but has stopped regular use after bleeding ulcers    Hypothyroidism  - continue synthroid 125 daily    Constipation  - start senokot scheduled  - miralax, MoM, dulcolax suppository PRN    Anxiety, OCD   - continue clominpramine, temazepam     Leukoplakia  - likely 2/2 long term cigarette use  - ?HIV testing    Anticipated Hospital stay: Observation admit        Quality Measures  Quality-Core Measures

## 2017-09-30 NOTE — ED NOTES
.  Chief Complaint   Patient presents with   • Chest Pain     pt with co chest pain since 6 am today not getting better, has had per pt a lot of hydroxycut today for energy more than she normally takes, has hx of rheumatoid arthritis denies n/v     .Pt Informed regarding triage process and verbalized understanding to inform triage tech or RN for any changes in condition.  Placed in lobby.

## 2017-09-30 NOTE — H&P
Internal Medicine Admitting History and Physical    Name Greer Vargas     1970   Age/Sex 47 y.o. female   MRN [id number]   Code Status DNAR     After 5PM or if no immediate response to page, please call for cross-coverage  Attending/Team: Dr. Faulkner/ Андрей See Patient List for primary contact information  Call [phone] to page    1st Call - Day Intern (R1):   Dr. Saenz 2nd Call - Day Sr. Resident (R2/R3):   Dr. Mojica       Chief Complaint:  Chest pain    HPI:  Patient is a 47 year old female with past medical history of rheumatoid arthritis and hypothyroidism who presents to the ER today with complaints of chest pain. Patient describes the pain as sharp, retrosternal, radiating to the back, no aggravating or relieving factors. The pain started around 6 pm today as she was talking to her friend/ driving. She took 2 tablets of naproxen without any relief. Patient reported having similar chest pains over the past couple of years for which she has been worked up, including getting a stress test which was negative. The pain today is more intense than usual and that prompted her to come to the ER. Patient reports taking hydroxycut every morning for fatigue. About 6 days back patient also started snorting methamphetamine about 2-3 times a day as it helps with her fatigue. Her last use was at 4.30 pm today. Patient also takes CBD oil for pain associated with rheumatoid arthritis. Denies any other drug use. Patient also denies any nausea/vomiting, diaphoresis, dizziness, shortness of breath , palpitations associated with her chest pain. No cough, fever, chills.    She also reports having constipation and black stools occasionally. She reports a previous history of hematemesis because of which she tries to avoid naproxen. She has reflux symptoms for which she takes prilosec occasionally.        Review of Systems   Constitutional: Positive for malaise/fatigue. Negative for chills and fever.   HENT:  Positive for sore throat. Negative for congestion.    Eyes: Negative for blurred vision, double vision and pain.   Respiratory: Negative for cough, hemoptysis, sputum production and shortness of breath.    Cardiovascular: Positive for chest pain. Negative for palpitations, orthopnea, leg swelling and PND.   Gastrointestinal: Positive for constipation, heartburn and melena. Negative for abdominal pain, diarrhea, nausea and vomiting.   Genitourinary: Negative for dysuria, frequency and urgency.   Musculoskeletal: Positive for joint pain. Negative for myalgias.   Skin: Negative for itching and rash.   Neurological: Negative for dizziness, tingling, sensory change and headaches.   Endo/Heme/Allergies: Does not bruise/bleed easily.   Psychiatric/Behavioral: Positive for depression and substance abuse. Negative for hallucinations and suicidal ideas. The patient is nervous/anxious.              Past Medical History:   Rheumatoid arthritis  Hypothyroidism      Past Surgical History:  Hysterectomy  Cholecystectomy  Hip surgery        Current Outpatient Medications:  Home Medications     Reviewed by Aga Leslie (Pharmacy Tech) on 09/29/17 at 2357  Med List Status: Complete   Medication Last Dose Status   Abatacept (ORENCIA) 125 MG/ML Solution Prefilled Syringe 9/26/2017 Active   aspirin (ASA) 81 MG Chew Tab chewable tablet 9/29/2017 Active   B Complex Vitamins (B COMPLEX-B12) Tab 9/29/2017 Active   cefdinir (OMNICEF) 300 MG Cap 9/29/2017 Active   clomipramine (ANAFRANIL) 50 MG Cap 9/28/2017 Active   estradiol (VAGIFEM) 10 MCG Tab 9/28/2017 Active   GINSENG PO 9/29/2017 Active   leucovorin 5 MG Tab 9/22/2017 Active   levothyroxine (SYNTHROID) 125 MCG Tab 9/29/2017 Active   Methotrexate Sodium 50 MG/2ML Solution 9/22/2017 Active   naproxen (ANAPROX) 220 MG tablet 9/29/2017 Active   nystatin (MYCOSTATIN) 436583 UNIT/ML Suspension 9/29/2017 Active   oxycodone-acetaminophen (PERCOCET) 7.5-325 MG per tablet 9/29/2017  "Active   temazepam (RESTORIL) 30 MG capsule 9/28/2017 Active   vitamin D (CHOLECALCIFEROL) 1000 UNIT Tab 9/29/2017 Active                Medication Allergy/Sensitivities:  No Known Allergies      Family History:  Mother-  MI, lung cancer      Social History:  Social History     Social History   • Marital status:      Spouse name: N/A   • Number of children: N/A   • Years of education: N/A     Occupational History   • Not on file.     Social History Main Topics   • Smoking status: Current Every Day Smoker     Packs/day: 1.00     Types: Cigarettes   • Smokeless tobacco: Never Used      Comment: 4 years ago .   • Alcohol use Yes      Comment: occ   • Drug use:       Comment: snorted meth 1 week ago, first time use   • Sexual activity: Not on file     Other Topics Concern   • Not on file     Social History Narrative   • No narrative on file     Living situation: Lives with  in [place]  PCP : Dr. Washington      Physical Exam     Vitals:    09/30/17 0000 09/30/17 0128 09/30/17 0130 09/30/17 0210   BP:    122/58   Pulse: 81  75 72   Resp: 15  14 16   Temp:    35.9 °C (96.6 °F)   SpO2: 95%  99% 99%   Weight:  76.2 kg (168 lb)  77.6 kg (171 lb 1.2 oz)   Height:    1.626 m (5' 4\")     Body mass index is 29.37 kg/m².  /58   Pulse 72   Temp 35.9 °C (96.6 °F)   Resp 16   Ht 1.626 m (5' 4\")   Wt 77.6 kg (171 lb 1.2 oz)   SpO2 99%   BMI 29.37 kg/m²   O2 therapy: Pulse Oximetry: 99 %, O2 (LPM): 0, O2 Delivery: None (Room Air)    Physical Exam   Constitutional: She is oriented to person, place, and time and well-developed, well-nourished, and in no distress.   Patient was tearful several times during the interview. She reported being stressed due to rheumatoid arthritis    HENT:   Head: Normocephalic and atraumatic.   Oral cavity : white patches seen on the right side of posterior tongue and gums anteriorly on the right side   Eyes: EOM are normal. Pupils are equal, round, and reactive to light.   Neck: Normal " range of motion. Neck supple.   Cardiovascular: Normal rate, regular rhythm and normal heart sounds.    No murmur heard.  Pulmonary/Chest: Effort normal and breath sounds normal. No respiratory distress. She has no wheezes.   Abdominal: Soft. Bowel sounds are normal. She exhibits no distension. There is no tenderness.   Musculoskeletal: Normal range of motion. She exhibits no edema or deformity.   Neurological: She is alert and oriented to person, place, and time. No cranial nerve deficit.   Skin: No rash noted. No erythema.   Psychiatric: Memory and judgment normal.             Data Review       Old Records Request:   Completed  Current Records review and summary: Completed    Lab Data Review:  Recent Results (from the past 24 hour(s))   EKG (ER)    Collection Time: 17 10:09 PM   Result Value Ref Range    Report       [facility]    Test Date:  2017  Pt Name:    NAIF WALKER                 Department: ER  MRN:        [id number]                      Room:  Gender:                                 Technician: [id number]  :        1970                   Requested By:ER TRIAGE PROTOCOL  Order #:    [id number]                    Reading MD: WHITNEY YOUSIF MD    Measurements  Intervals                                Axis  Rate:       88                           P:          39  OR:         156                          QRS:        47  QRSD:       86                           T:          11  QT:         372  QTc:        450    Interpretive Statements  SINUS RHYTHM  PROBABLE LEFT ATRIAL ABNORMALITY  BORDERLINE T ABNORMALITIES, INFERIOR LEADS  No previous ECG available for comparison  No Acute findings  non specific twave flattening III, AVF, V3  Electronically Signed On 2017 22:52:42 PDT by WHITNEY YOUSIF MD     URINE DRUG SCREEN    Collection Time: 17 10:10 PM   Result Value Ref Range    Amphetamines Urine Positive (A) Negative    Barbiturates Negative  Negative    Benzodiazepines Negative Negative    Cocaine Metabolite Negative Negative    Methadone Negative Negative    Opiates Negative Negative    Oxycodone 100.00 Negative    Phencyclidine -Pcp Negative Negative    Propoxyphene Negative Negative    Cannabinoid Metab Negative Negative   CBC w/ Differential    Collection Time: 09/29/17 10:45 PM   Result Value Ref Range    WBC 7.0 4.8 - 10.8 K/uL    RBC 4.28 4.20 - 5.40 M/uL    Hemoglobin 14.8 12.0 - 16.0 g/dL    Hematocrit 42.2 37.0 - 47.0 %    MCV 98.6 (H) 81.4 - 97.8 fL    MCH 34.6 (H) 27.0 - 33.0 pg    MCHC 35.1 (H) 33.6 - 35.0 g/dL    RDW 49.6 35.9 - 50.0 fL    Platelet Count 360 164 - 446 K/uL    MPV 8.5 (L) 9.0 - 12.9 fL    Neutrophils-Polys 76.70 (H) 44.00 - 72.00 %    Lymphocytes 15.10 (L) 22.00 - 41.00 %    Monocytes 5.80 0.00 - 13.40 %    Eosinophils 1.40 0.00 - 6.90 %    Basophils 0.70 0.00 - 1.80 %    Immature Granulocytes 0.30 0.00 - 0.90 %    Nucleated RBC 0.00 /100 WBC    Neutrophils (Absolute) 5.37 2.00 - 7.15 K/uL    Lymphs (Absolute) 1.06 1.00 - 4.80 K/uL    Monos (Absolute) 0.41 0.00 - 0.85 K/uL    Eos (Absolute) 0.10 0.00 - 0.51 K/uL    Baso (Absolute) 0.05 0.00 - 0.12 K/uL    Immature Granulocytes (abs) 0.02 0.00 - 0.11 K/uL    NRBC (Absolute) 0.00 K/uL   Complete Metabolic Panel (CMP)    Collection Time: 09/29/17 10:45 PM   Result Value Ref Range    Sodium 136 135 - 145 mmol/L    Potassium 3.7 3.6 - 5.5 mmol/L    Chloride 102 96 - 112 mmol/L    Co2 25 20 - 33 mmol/L    Anion Gap 9.0 0.0 - 11.9    Glucose 100 (H) 65 - 99 mg/dL    Bun 9 8 - 22 mg/dL    Creatinine 0.79 0.50 - 1.40 mg/dL    Calcium 9.5 8.5 - 10.5 mg/dL    AST(SGOT) 27 12 - 45 U/L    ALT(SGPT) 30 2 - 50 U/L    Alkaline Phosphatase 70 30 - 99 U/L    Total Bilirubin 0.4 0.1 - 1.5 mg/dL    Albumin 4.4 3.2 - 4.9 g/dL    Total Protein 7.3 6.0 - 8.2 g/dL    Globulin 2.9 1.9 - 3.5 g/dL    A-G Ratio 1.5 g/dL   Troponin STAT    Collection Time: 09/29/17 10:45 PM   Result Value Ref Range     Troponin I <0.01 0.00 - 0.04 ng/mL   Lipase    Collection Time: 09/29/17 10:45 PM   Result Value Ref Range    Lipase 34 11 - 82 U/L   PT/INR    Collection Time: 09/29/17 10:45 PM   Result Value Ref Range    PT 13.2 12.0 - 14.6 sec    INR 0.97 0.87 - 1.13   APTT    Collection Time: 09/29/17 10:45 PM   Result Value Ref Range    APTT 32.1 24.7 - 36.0 sec   ESTIMATED GFR    Collection Time: 09/29/17 10:45 PM   Result Value Ref Range    GFR If African American >60 >60 mL/min/1.73 m 2    GFR If Non African American >60 >60 mL/min/1.73 m 2   D-DIMER    Collection Time: 09/29/17 10:45 PM   Result Value Ref Range    D-Dimer Screen <200 <250 ng/mL(D-DU)   DIAGNOSTIC ALCOHOL    Collection Time: 09/29/17 10:45 PM   Result Value Ref Range    Diagnostic Alcohol 0.00 0.00 g/dL       Imaging/Procedures Review:    ndependant Imaging Review: Completed  DX-CHEST-PORTABLE (1 VIEW)   Final Result      No acute cardiopulmonary abnormality.               EKG:   EKG Independant Review: Completed  QTc:450, HR: 88, Normal Sinus Rhythm, flattening of T waves on leads V3 and III, no ST changes    Records reviewed and summarized in current documentation             Assessment/Plan        Chest pain   Assessment & Plan    - 47 year old female patient with recent use of methamphetamine presents with chest pain. Other risk factors for ACS include smoking and rheumatoid arthritis. No HTN, DM or dyslipidemia. Patient reports mild tenderness to palpation- the pain could be musculoskeletal in origin.  - chest X ray did not show any acute abnormality  - Initial EKG and troponin were normal  Plan:  - NPO for possible stress test tomorrow  - Aspirin 325mg once and 81mg from tomorrow  - statin  - Trend troponin and repeat EKG              Rheumatoid arthritis (CMS-HCC)   Assessment & Plan    - Patient reports poorly controlled symptoms  - Takes methotrexate SC and leucovorin once a week                         Anticipated Hospital stay: Observation  admit        Quality Measures    Reviewed items::  EKG reviewed, Medications reviewed and Radiology images reviewed  Child catheter::  No Child  DVT prophylaxis pharmacological::  Enoxaparin (Lovenox)  Ulcer Prophylaxis::  Not indicated

## 2017-09-30 NOTE — DISCHARGE INSTRUCTIONS
Discharge Instructions    Discharged to home by car with relative. Discharged via walking, hospital escort: Yes.  Special equipment needed: Not Applicable    Be sure to schedule a follow-up appointment with your primary care doctor or any specialists as instructed.     Discharge Plan:   Diet Plan: Discussed  Activity Level: Discussed  Smoking Cessation Offered: Patient Refused  Confirmed Follow up Appointment: Appointment Scheduled  Confirmed Symptoms Management: Discussed  Medication Reconciliation Updated: Yes  Influenza Vaccine Indication: Patient Refuses    I understand that a diet low in cholesterol, fat, and sodium is recommended for good health. Unless I have been given specific instructions below for another diet, I accept this instruction as my diet prescription.   Other diet: Regular Diet    Special Instructions: None    · Is patient discharged on Warfarin / Coumadin?   No     · Is patient Post Blood Transfusion?  No    Depression / Suicide Risk    As you are discharged from this [facility] facility, it is important to learn how to keep safe from harming yourself.    Recognize the warning signs:  · Abrupt changes in personality, positive or negative- including increase in energy   · Giving away possessions  · Change in eating patterns- significant weight changes-  positive or negative  · Change in sleeping patterns- unable to sleep or sleeping all the time   · Unwillingness or inability to communicate  · Depression  · Unusual sadness, discouragement and loneliness  · Talk of wanting to die  · Neglect of personal appearance   · Rebelliousness- reckless behavior  · Withdrawal from people/activities they love  · Confusion- inability to concentrate     If you or a loved one observes any of these behaviors or has concerns about self-harm, here's what you can do:  · Talk about it- your feelings and reasons for harming yourself  · Remove any means that you might use to hurt yourself (examples: pills, rope,  extension cords, firearm)  · Get professional help from the community (Mental Health, Substance Abuse, psychological counseling)  · Do not be alone:Call your Safe Contact- someone whom you trust who will be there for you.  · Call your local CRISIS HOTLINE 100-0868 or 037-994-8332  · Call your local Children's Mobile Crisis Response Team [place] (487) 646-2894 or www.[employer]  · Call the toll free National Suicide Prevention Hotlines   · National Suicide Prevention Lifeline 867-169-EMBM (4503)  · National Hope Line Network 800-SUICIDE (903-1581)

## 2017-09-30 NOTE — DISCHARGE SUMMARY
CHIEF COMPLAINT ON ADMISSION  Chief Complaint   Patient presents with   • Chest Pain     pt with co chest pain since 6 am today not getting better, has had per pt a lot of hydroxycut today for energy more than she normally takes, has hx of rheumatoid arthritis denies n/v       CODE STATUS  DNR    HPI & HOSPITAL COURSE   47 y.o. female with pmhx of rheumatoid arthritis and hypothyroidism presented to the ER with complaints of sharp retrostrenal chest pain, radiating to the back, no aggravating or relieving factors. The pain started around 6 pm 9/29 at rest. She took 2 tablets of naproxen without any relief. Patient reported having similar chest pains over the past couple of years for which she has been worked up, including getting a stress test which was negative. She typically attributes the pain to costochondritis and RA. The pain on presentation is more intense than usual, prompting her to come to the ER. She takes hydroxycut every morning for fatigue. About 6 days back patient also started snorting meth about 2-3 times a day as it helps with her fatigue. Her last use was at 4.30 pm 9/29. Patient also takes CBD oil for pain associated with rheumatoid arthritis. Denies any other drug use. Patient also denies any nausea/vomiting, diaphoresis, dizziness, shortness of breath, palpitations associated with her chest pain. No cough, fever, chills.     Also reported constipation and black stools. She reports a previous history of hematemesis because of which she tries to avoid naproxen. She has reflux symptoms for which she takes prilosec occasionally. Bedside FOBT negative.     Physical exam revealed no breast masses, mildly reproducible chest wall tenderness. VSS, unremarkable cardiac exam. Workup: UDS +amphetamines and opiates, D-Dimer negative, CXR negative, troponins negative x3, EKG x2 with no ST elevation/depression but did show some dynamic t wave changes. Given her hx of RA, tobacco use, and recent meth use, MPI  was ordered; however, it was not done as amphetamine use was stated to be a contraindication. MPI ordered outpatient.     Therefore, she is discharged in good and stable condition with close outpatient follow-up.    >30 minutes spent counseling pt with motivational interviewing on diet, exercise, smoking cessation, and cessation of stimulants including meth and hydroxycut, as well as counseling on behavioral options for fatigue and pain management. Pt to f/u with primary care and rheumatologist. Referral placed for outpatient NM stress test.    SPECIFIC OUTPATIENT FOLLOW-UP  F/u for MPI study  October 13, 2017 9:30 AM  NM Cardiac Stress test with [facility] NM  DSPECT 2  [facility]  [address] [place] [address]    DISCHARGE PROBLEM LIST  Active Problems:    Chest pain POA: Unknown    Rheumatoid arthritis (CMS-HCC) POA: Unknown  Resolved Problems:    * No resolved hospital problems. *      FOLLOW UP    Ulisses Fritz M.D.  [address] [facility] [address]  [phone]    Schedule an appointment as soon as possible for a visit      Greta Coates M.D.  [address] [address]  [facility] [address]  [phone]    Schedule an appointment as soon as possible for a visit        MEDICATIONS ON DISCHARGE   Greer Vargas   Home Medication Instructions BARTOLOME:[id number]    Printed on:09/30/17 1139   Medication Information                      Abatacept (ORENCIA) 125 MG/ML Solution Prefilled Syringe  Inject 125 mg as instructed every Tuesday.             aspirin (ASA) 81 MG Chew Tab chewable tablet  Take 81 mg by mouth every day.             B Complex Vitamins (B COMPLEX-B12) Tab  Take 1 Tab by mouth every day.             cefdinir (OMNICEF) 300 MG Cap  Take 1 Cap by mouth 2 times a day for 10 days.             clomipramine (ANAFRANIL) 50 MG Cap  Take 1,000 mg by mouth every evening.             estradiol (VAGIFEM) 10 MCG Tab  Insert 10 mcg in vagina every evening.             GINSENG PO  Take 2  "Caps by mouth every day. \"Ginsana\"             leucovorin 5 MG Tab  Take 5 mg by mouth every Friday. After methotrexate             levothyroxine (SYNTHROID) 125 MCG Tab  Take 125 mcg by mouth Every morning on an empty stomach.             Methotrexate Sodium 50 MG/2ML Solution  25 mg every Friday. Subcutaneous             naproxen (ANAPROX) 220 MG tablet  Take 220-440 mg by mouth 2 times a day as needed (pain).             nystatin (MYCOSTATIN) 633351 UNIT/ML Suspension  Take 5 mL by mouth 4 times a day for 10 days.             oxycodone-acetaminophen (PERCOCET) 7.5-325 MG per tablet  Take 1 Tab by mouth every 6 hours as needed for Severe Pain.             temazepam (RESTORIL) 30 MG capsule  Take 30 mg by mouth every bedtime.             vitamin D (CHOLECALCIFEROL) 1000 UNIT Tab  Take 1,000 Units by mouth every day.                 DIET  Advised pt to follow a whole foods plant based healthy diet.    ACTIVITY  As tolerated.  Exercise encouraged.  Weight bearing as tolerated      CONSULTATIONS  None    PROCEDURES  None    LABORATORY  Lab Results   Component Value Date/Time    SODIUM 136 09/29/2017 10:45 PM    POTASSIUM 3.7 09/29/2017 10:45 PM    CHLORIDE 102 09/29/2017 10:45 PM    CO2 25 09/29/2017 10:45 PM    GLUCOSE 100 (H) 09/29/2017 10:45 PM    BUN 9 09/29/2017 10:45 PM    CREATININE 0.79 09/29/2017 10:45 PM        Lab Results   Component Value Date/Time    WBC 7.0 09/29/2017 10:45 PM    HEMOGLOBIN 14.8 09/29/2017 10:45 PM    HEMATOCRIT 42.2 09/29/2017 10:45 PM    PLATELETCT 360 09/29/2017 10:45 PM     Results for NAIF DEL VALLE (MRN [id number]) as of 9/30/2017 11:45   Ref. Range 9/29/2017 22:45 9/30/2017 05:35 9/30/2017 09:55   Troponin I Latest Ref Range: 0.00 - 0.04 ng/mL <0.01 <0.01 <0.01     Results for NAIF DEL VALLE (MRN [id number]) as of 9/30/2017 11:45   Ref. Range 9/29/2017 22:45   TSH Latest Ref Range: 0.300 - 3.700 uIU/mL 0.570     Results for NAIF DEL VALLE (MRN [id number]) as of 9/30/2017 11:45   Ref. " "Range 9/29/2017 22:10   Phencyclidine -Pcp Latest Ref Range: Negative  Negative   Benzodiazepines Latest Ref Range: Negative  Negative   Cocaine Metabolite Latest Ref Range: Negative  Negative   Barbiturates Latest Ref Range: Negative  Negative   Opiates Latest Ref Range: Negative  Negative   Methadone Latest Ref Range: Negative  Negative   Cannabinoid Metab Latest Ref Range: Negative  Negative   Propoxyphene Latest Ref Range: Negative  Negative   Oxycodone Latest Ref Range: Negative  Negative   Amphetamines Urine Latest Ref Range: Negative  Positive (A)     Results for NAIF DEL VALLE (MRN [id number]) as of 9/30/2017 11:45   Ref. Range 9/29/2017 22:45   Diagnostic Alcohol Latest Ref Range: 0.00 g/dL 0.00     ______________________________________________________________________    Interval history/exam for day of discharge:    Patient with continued chest pain overnight, reproducible on palpation, non-radiating, non-pleuritic, she states that she was previously diagnosed with costochondritis after an episode of chest pain 1 year prior with a negative stress test, long conversation with patient regarding her chest pain, methamphetamine use, narcotic use and overall medical care, no new complaints, stable overnight.    Vitals:    09/30/17 0210 09/30/17 0353 09/30/17 0400 09/30/17 0711   BP: 122/58  114/56 122/67   Pulse: 72 77 88 73   Resp: 16 16 20 16   Temp: 35.9 °C (96.6 °F)  36.7 °C (98 °F) 36.7 °C (98.1 °F)   SpO2: 99% 97% 98% 97%   Weight: 77.6 kg (171 lb 1.2 oz)      Height: 1.626 m (5' 4\")        Weight/BMI: Body mass index is 29.37 kg/m².  Pulse Oximetry: 97 %, O2 (LPM): 0, O2 Delivery: None (Room Air)      Most Recent Labs:    Lab Results   Component Value Date/Time    WBC 7.0 09/29/2017 10:45 PM    RBC 4.28 09/29/2017 10:45 PM    HEMOGLOBIN 14.8 09/29/2017 10:45 PM    HEMATOCRIT 42.2 09/29/2017 10:45 PM    MCV 98.6 (H) 09/29/2017 10:45 PM    MCH 34.6 (H) 09/29/2017 10:45 PM    MCHC 35.1 (H) 09/29/2017 10:45 " PM    MPV 8.5 (L) 09/29/2017 10:45 PM    NEUTSPOLYS 76.70 (H) 09/29/2017 10:45 PM    LYMPHOCYTES 15.10 (L) 09/29/2017 10:45 PM    MONOCYTES 5.80 09/29/2017 10:45 PM    EOSINOPHILS 1.40 09/29/2017 10:45 PM    BASOPHILS 0.70 09/29/2017 10:45 PM      Lab Results   Component Value Date/Time    SODIUM 136 09/29/2017 10:45 PM    POTASSIUM 3.7 09/29/2017 10:45 PM    CHLORIDE 102 09/29/2017 10:45 PM    CO2 25 09/29/2017 10:45 PM    GLUCOSE 100 (H) 09/29/2017 10:45 PM    BUN 9 09/29/2017 10:45 PM    CREATININE 0.79 09/29/2017 10:45 PM      Lab Results   Component Value Date/Time    ALTSGPT 30 09/29/2017 10:45 PM    ASTSGOT 27 09/29/2017 10:45 PM    ALKPHOSPHAT 70 09/29/2017 10:45 PM    TBILIRUBIN 0.4 09/29/2017 10:45 PM    LIPASE 34 09/29/2017 10:45 PM    ALBUMIN 4.4 09/29/2017 10:45 PM    GLOBULIN 2.9 09/29/2017 10:45 PM    INR 0.97 09/29/2017 10:45 PM     Lab Results   Component Value Date/Time    PROTHROMBTM 13.2 09/29/2017 10:45 PM    INR 0.97 09/29/2017 10:45 PM        Physical Exam   Constitutional: She appears well-developed and well-nourished. No distress.   HENT:   Head: Normocephalic and atraumatic.   Eyes: Conjunctivae are normal. Pupils are equal, round, and reactive to light. Right eye exhibits no discharge. Left eye exhibits no discharge.   Neck: Normal range of motion. No tracheal deviation present. No thyromegaly present.   Cardiovascular: Normal rate, regular rhythm and normal heart sounds.  Exam reveals no gallop and no friction rub.    No murmur heard.  Pulmonary/Chest: Breath sounds normal. No respiratory distress. She has no wheezes. Right breast exhibits no inverted nipple, no mass, no nipple discharge, no skin change and no tenderness. Left breast exhibits no inverted nipple, no mass, no nipple discharge, no skin change and no tenderness. Breasts are symmetrical.       Location of pain on chest wall, mild tenderness to palpation   Abdominal: Soft. Bowel sounds are normal. She exhibits no distension.  There is no tenderness. There is no rebound and no guarding.   Genitourinary: Rectal exam shows no mass, no tenderness and guaiac negative stool.   Skin: She is not diaphoretic.       Total time of the discharge process exceeds 45 minutes

## 2017-09-30 NOTE — PROGRESS NOTES
A/o,assessment completed per CDU,poc discussed,verbalized understanding,denies CP, SR on tele hr= 79,npo ,call button reach,will continue to monitor.

## 2017-09-30 NOTE — ED PROVIDER NOTES
ED Provider Note    Scribed for Agustina Valladares M.D. by Rama Pride. 9/29/2017, 10:47 PM.    Primary care provider: Ulisses Fritz M.D.  Means of arrival: Walk-in   History obtained from: Patient   History limited by: None     CHIEF COMPLAINT  Chief Complaint   Patient presents with   • Chest Pain     pt with co chest pain since 6 am today not getting better, has had per pt a lot of hydroxycut today for energy more than she normally takes, has hx of rheumatoid arthritis denies n/v       HPI  Greer Vargas is a 47 y.o. female who presents to the Emergency Department due to worsening chest pain onset this morning. She began experiencing pain this morning, however this was then improved. She then began experiencing worsening chest pain once again 4 hours prior to exam. Her pain is primarily located in the left upper chest and radiates to the back. She has taken baby aspirin, which has only mildly alleviated her pain. She also reports associated mild shortness of breath. Patient notes taking more Hydroxycut today than she generally takes to increase her energy. She has also used methamphetamine for the past week to increase her energy levels, although she states she does not generally use methamphetamine regularly. The patient has a history of rheumatoid arthritis, which she states has not been well controlled. She notes having a stress test over one year ago due to an episode of chest pain. She denies fever, chills, nausea, or vomiting.     REVIEW OF SYSTEMS  Pertinent positives include chest pain, back pain, shortness of breath. Pertinent negatives include no fever, chills, nausea, vomiting. As above, all other systems reviewed and are negative.   See HPI for further details. C     PAST MEDICAL HISTORY  Patient has a history of rheumatoid arthritis.     SURGICAL HISTORY  None noted.     SOCIAL HISTORY  Social History   Substance Use Topics   • Smoking status: Former Smoker   • Smokeless tobacco: Never Used       "Comment: 4 years ago .      History   Drug Use     Comment: snorted meth 1 week ago, first time use   Patient notes use of methamphetamine.     FAMILY HISTORY  None noted.     CURRENT MEDICATIONS  No current facility-administered medications on file prior to encounter.      Current Outpatient Prescriptions on File Prior to Encounter   Medication Sig Dispense Refill   • cefdinir (OMNICEF) 300 MG Cap Take 1 Cap by mouth 2 times a day for 10 days. 20 Cap 0   • nystatin (MYCOSTATIN) 165989 UNIT/ML Suspension Take 5 mL by mouth 4 times a day for 10 days. 200 mL 0   • leucovorin 5 MG Tab Take 5 mg by mouth every Friday. After methotrexate     • estradiol (VAGIFEM) 10 MCG Tab Insert 10 mcg in vagina every evening.     • levothyroxine (SYNTHROID) 125 MCG Tab Take 125 mcg by mouth Every morning on an empty stomach.         ALLERGIES  No Known Allergies    PHYSICAL EXAM  VITAL SIGNS: BP (!) 170/74   Pulse (!) 105   Temp 36.2 °C (97.2 °F)   Resp 18   Ht 1.626 m (5' 4\")   SpO2 98%   Vitals reviewed.    Consitutional: Well-developed, well-nourished. Negative for: distress.  HENT: Normocephalic, right external ear normal, left external ear normal, oropharynx clear and moist.  Eyes: Conjunctivae normal, extraocular movements normal. Negative for: discharge in right and left eye, icterus.  Neck: Range of motion normal, supple. Negative for cervical adenopathy.  Cardiovascular: Intermittent tachycardia, regular rhythm, heart sounds normal, intact distal pulses. Negative for: murmur, rub, gallop.  Pulmonary/Chest Wall: Effort normal, breath sounds normal. Negative for: respiratory distress, wheezes, rales, rhonchi.   Abdominal: Soft, bowel sounds normal. Negative for: distention, tenderness, rebound, guarding.  Musculoskeletal: Normal range of motion. Negative for edema. No tenderness to either calf.   Neurological: Alert and oriented x3. No focal deficits.  Skin: Warm, dry. Negative for rash.  Psych: Pressured speech. Anxious. " Occasionally tearful.     DIAGNOSTIC STUDIES / PROCEDURES    LABS  Results for orders placed or performed during the hospital encounter of 09/29/17   CBC w/ Differential   Result Value Ref Range    WBC 7.0 4.8 - 10.8 K/uL    RBC 4.28 4.20 - 5.40 M/uL    Hemoglobin 14.8 12.0 - 16.0 g/dL    Hematocrit 42.2 37.0 - 47.0 %    MCV 98.6 (H) 81.4 - 97.8 fL    MCH 34.6 (H) 27.0 - 33.0 pg    MCHC 35.1 (H) 33.6 - 35.0 g/dL    RDW 49.6 35.9 - 50.0 fL    Platelet Count 360 164 - 446 K/uL    MPV 8.5 (L) 9.0 - 12.9 fL    Neutrophils-Polys 76.70 (H) 44.00 - 72.00 %    Lymphocytes 15.10 (L) 22.00 - 41.00 %    Monocytes 5.80 0.00 - 13.40 %    Eosinophils 1.40 0.00 - 6.90 %    Basophils 0.70 0.00 - 1.80 %    Immature Granulocytes 0.30 0.00 - 0.90 %    Nucleated RBC 0.00 /100 WBC    Neutrophils (Absolute) 5.37 2.00 - 7.15 K/uL    Lymphs (Absolute) 1.06 1.00 - 4.80 K/uL    Monos (Absolute) 0.41 0.00 - 0.85 K/uL    Eos (Absolute) 0.10 0.00 - 0.51 K/uL    Baso (Absolute) 0.05 0.00 - 0.12 K/uL    Immature Granulocytes (abs) 0.02 0.00 - 0.11 K/uL    NRBC (Absolute) 0.00 K/uL   Complete Metabolic Panel (CMP)   Result Value Ref Range    Sodium 136 135 - 145 mmol/L    Potassium 3.7 3.6 - 5.5 mmol/L    Chloride 102 96 - 112 mmol/L    Co2 25 20 - 33 mmol/L    Anion Gap 9.0 0.0 - 11.9    Glucose 100 (H) 65 - 99 mg/dL    Bun 9 8 - 22 mg/dL    Creatinine 0.79 0.50 - 1.40 mg/dL    Calcium 9.5 8.5 - 10.5 mg/dL    AST(SGOT) 27 12 - 45 U/L    ALT(SGPT) 30 2 - 50 U/L    Alkaline Phosphatase 70 30 - 99 U/L    Total Bilirubin 0.4 0.1 - 1.5 mg/dL    Albumin 4.4 3.2 - 4.9 g/dL    Total Protein 7.3 6.0 - 8.2 g/dL    Globulin 2.9 1.9 - 3.5 g/dL    A-G Ratio 1.5 g/dL   Troponin STAT   Result Value Ref Range    Troponin I <0.01 0.00 - 0.04 ng/mL   Lipase   Result Value Ref Range    Lipase 34 11 - 82 U/L   PT/INR   Result Value Ref Range    PT 13.2 12.0 - 14.6 sec    INR 0.97 0.87 - 1.13   APTT   Result Value Ref Range    APTT 32.1 24.7 - 36.0 sec   ESTIMATED  GFR   Result Value Ref Range    GFR If African American >60 >60 mL/min/1.73 m 2    GFR If Non African American >60 >60 mL/min/1.73 m 2   All labs reviewed by me.    EKG Interpretation:  12-lead EKG by my interpretation shows normal sinus rhythm at a rate of 85. No pathologic Q waves. Normal axis. No ST or T-wave changes to indicate ischemia or infarct. No old EKGs available for comparison.    RADIOLOGY  DX-CHEST-PORTABLE (1 VIEW)   Final Result      No acute cardiopulmonary abnormality.      The radiologist's interpretation of all radiological studies have been reviewed by me.    COURSE & MEDICAL DECISION MAKING  Nursing notes, VS, PMSFHx reviewed in chart.    Attempted to obtain old records, but it does not appear the patient has ever been here.    10:47 PM Patient seen and examined at bedside. The patient presents with chest pain and the differential diagnosis includes but is not limited to cardiac, GI, PE which is medium probability by well's criteria musculoskeletal pain. Ordered chest x-ray, estimated GFR, CBC with differential, CMP, troponin, lipase, PT/INR, APTT, and EKG.     11:53 PM Paged [facility].      12:20 AM - Patient's case was discussed with [facility]. They agree to admit the patient.    DISPOSITION:  Patient will be admitted to [facility] in guarded condition.      FINAL IMPRESSION  1. Intermittent left-sided chest pain    2. Shortness of breath    3. Excessive caffeine abuse, continuous    4. Methamphetamine abuse, episodic    5. Situational mixed anxiety and depressive disorder          Rama GARCIA), am scribing for, and in the presence of, Agustina Valladares M.D..    Electronically signed by: Rama Pride (Romuloibrasta), 9/29/2017    Agustina GARCIA M.D. personally performed the services described in this documentation, as scribed by Rama Pride in my presence, and it is both accurate and complete.    The note accurately reflects work and decisions made by  me.  Agustina Valladares  9/30/2017  12:46 AM

## 2017-09-30 NOTE — SENIOR ADMIT NOTE
"Senior Admit Note    Greer Vargas 47 y.o. female with PMhx of RA presented to the hospital with complaint of chest pain that started at 6PM. Patient was talking to her friend at the time of chest pain. She said she has RA and she is not sure whether this pain is due to the heart or due to RA. She reported that she has this pain before and she underwent stress test and it didn't show any abnormalities. Patient have this pain for years. Patient had stress test at [facility]. She has been using hydroxycut for energy. She also use methamphetamine 1 week ago for energy. She has been using 3 times a day for last 6 days. She has been using CBT oil for pain. She also has been taking excessive amount of naproxen in past but bot now and Prilosec.     Physical Exam:    General: Not in acute distress  HEENT: NCAT  Resp: Clear to auscultation B/L with no wheeze  CVS: Regular rate and rhythm   Abdomen: Soft non tender +BS  Neuro: No focal deficit, CN II-XII grossly intact    Vitals:    09/30/17 0000 09/30/17 0128 09/30/17 0130 09/30/17 0210   BP:    122/58   Pulse: 81  75 72   Resp: 15  14 16   Temp:    35.9 °C (96.6 °F)   SpO2: 95%  99% 99%   Weight:  76.2 kg (168 lb)  77.6 kg (171 lb 1.2 oz)   Height:    1.626 m (5' 4\")     DX-CHEST-PORTABLE (1 VIEW)   Final Result      No acute cardiopulmonary abnormality.          Assessment and plan:    Patient is here with complaint of chest pain. She has been using many stimulant. Her last meth use was at 4:30 PM and she started having pain at 6:00 PM. Initial EKG, Trop and D-dimer are negative. Her vitals are stable and she does not have any chest pain. Although she is female and 47 years of age. We will rule out ACS by checking EKG and Trop x Q6H. At the same time chest pain could be due to RA. Her last meth use was at 4:30 PM and she started having pain at 6:00 PM    Code status: DNR    DVT Prophylaxis: Lovenox            "

## 2017-09-30 NOTE — ASSESSMENT & PLAN NOTE
- 47 year old female patient with recent use of methamphetamine presents with chest pain. Other risk factors for ACS include smoking and rheumatoid arthritis. No HTN, DM or dyslipidemia. Patient reports mild tenderness to palpation- the pain could be musculoskeletal in origin.  - chest X ray did not show any acute abnormality  - Initial EKG and troponin were normal  Plan:  - NPO for possible stress test tomorrow  - Aspirin 325mg once and 81mg from tomorrow  - statin  - Trend troponin and repeat EKG

## 2017-09-30 NOTE — PROGRESS NOTES
Waiting for [facility] for rounds, still NPO. Did talk to [facility] suggest waiting for attending if patient need stress test.

## 2017-09-30 NOTE — ED NOTES
Pt ambulatory to room. Agree with triage note. Chart ready for ERP. PIV established and blood sent to lab. Pt states that the chest pain comes and goes but when it comes it is sudden and sharp.  Denies cardiac hx.

## 2017-09-30 NOTE — ED NOTES
Med rec completed per pt at bedside  Allergies reviewed - NKMILTON  Pt started a 10 day course of cefdinir and nystatin suspension on 9/26/17

## 2017-10-01 LAB — EKG IMPRESSION: NORMAL

## 2017-10-13 ENCOUNTER — APPOINTMENT (OUTPATIENT)
Dept: RADIOLOGY | Facility: MEDICAL CENTER | Age: 47
End: 2017-10-13
Payer: COMMERCIAL

## 2017-10-16 ENCOUNTER — APPOINTMENT (OUTPATIENT)
Dept: SOCIAL WORK | Facility: CLINIC | Age: 47
End: 2017-10-16
Payer: COMMERCIAL

## 2017-10-16 PROCEDURE — 90471 IMMUNIZATION ADMIN: CPT | Performed by: REGISTERED NURSE

## 2017-10-16 PROCEDURE — 90686 IIV4 VACC NO PRSV 0.5 ML IM: CPT | Performed by: REGISTERED NURSE
